# Patient Record
Sex: MALE | Race: WHITE | NOT HISPANIC OR LATINO | URBAN - METROPOLITAN AREA
[De-identification: names, ages, dates, MRNs, and addresses within clinical notes are randomized per-mention and may not be internally consistent; named-entity substitution may affect disease eponyms.]

---

## 2018-06-04 ENCOUNTER — OFFICE VISIT (OUTPATIENT)
Dept: URBAN - METROPOLITAN AREA CLINIC 23 | Facility: CLINIC | Age: 78
End: 2018-06-04
Payer: COMMERCIAL

## 2018-06-04 DIAGNOSIS — H02.132 SENILE ECTROPION OF RIGHT LOWER EYELID: Primary | ICD-10-CM

## 2018-06-04 PROCEDURE — 99214 OFFICE O/P EST MOD 30 MIN: CPT | Performed by: OPHTHALMOLOGY

## 2018-06-04 PROCEDURE — 92285 EXTERNAL OCULAR PHOTOGRAPHY: CPT | Performed by: OPHTHALMOLOGY

## 2018-06-04 ASSESSMENT — INTRAOCULAR PRESSURE
OD: 14
OS: 13

## 2018-06-04 NOTE — IMPRESSION/PLAN
Impression: Senile ectropion of right lower eyelid: H02.132. Plan: Discussed diagnosis in detail with patient. Discussed treatment options with patient. Surgical risks and benefits were discussed, explained and understood by patient. Surgical treatment is required. Patient elects to have surgery. Recommend Right lower eyelid ectropion repair. RL3.  Need clearance for all blood thinners

## 2018-08-01 ENCOUNTER — SURGERY (OUTPATIENT)
Dept: URBAN - METROPOLITAN AREA SURGERY 11 | Facility: SURGERY | Age: 78
End: 2018-08-01
Payer: COMMERCIAL

## 2018-08-01 PROCEDURE — 67917 REPAIR EYELID DEFECT: CPT | Performed by: OPHTHALMOLOGY

## 2018-08-03 ENCOUNTER — POST-OPERATIVE VISIT (OUTPATIENT)
Dept: URBAN - METROPOLITAN AREA CLINIC 23 | Facility: CLINIC | Age: 78
End: 2018-08-03
Payer: COMMERCIAL

## 2018-08-03 PROCEDURE — 99024 POSTOP FOLLOW-UP VISIT: CPT | Performed by: OPTOMETRIST

## 2018-08-03 RX ORDER — NEOMYCIN, POLYMYXIN B SULFATES, DEXAMETHASONE 1; 3.5; 1 MG/G; MG/G; [USP'U]/G
OINTMENT OPHTHALMIC PRN
Qty: 1 | Refills: 0 | Status: INACTIVE
Start: 2018-08-03 | End: 2021-04-02

## 2018-08-13 ENCOUNTER — POST-OPERATIVE VISIT (OUTPATIENT)
Dept: URBAN - METROPOLITAN AREA CLINIC 23 | Facility: CLINIC | Age: 78
End: 2018-08-13

## 2018-08-13 DIAGNOSIS — Z09 ENCNTR FOR F/U EXAM AFT TRTMT FOR COND OTH THAN MALIG NEOPLM: Primary | ICD-10-CM

## 2018-08-13 PROCEDURE — 99024 POSTOP FOLLOW-UP VISIT: CPT | Performed by: OPHTHALMOLOGY

## 2018-08-13 ASSESSMENT — INTRAOCULAR PRESSURE
OS: 11
OD: 13

## 2018-09-04 ENCOUNTER — POST-OPERATIVE VISIT (OUTPATIENT)
Dept: URBAN - METROPOLITAN AREA CLINIC 16 | Facility: CLINIC | Age: 78
End: 2018-09-04
Payer: COMMERCIAL

## 2018-09-04 PROCEDURE — 99024 POSTOP FOLLOW-UP VISIT: CPT | Performed by: OPTOMETRIST

## 2018-09-04 ASSESSMENT — INTRAOCULAR PRESSURE
OS: 14
OD: 16

## 2021-04-02 ENCOUNTER — OFFICE VISIT (OUTPATIENT)
Dept: URBAN - METROPOLITAN AREA CLINIC 29 | Facility: CLINIC | Age: 81
End: 2021-04-02
Payer: MEDICARE

## 2021-04-02 DIAGNOSIS — H31.093 OTHER CHORIORETINAL SCARS, BILATERAL: Primary | ICD-10-CM

## 2021-04-02 PROCEDURE — 92014 COMPRE OPH EXAM EST PT 1/>: CPT | Performed by: OPTOMETRIST

## 2021-04-02 ASSESSMENT — INTRAOCULAR PRESSURE
OD: 15
OS: 15

## 2021-04-02 NOTE — IMPRESSION/PLAN
Impression: Other chorioretinal scars, bilateral: H31.093. Plan: Discussed diagnosis in detail with patient. Discussed treatment options with patient. Use of vitamins has shown to improve the effects of ARMD.  Use of [place] grid was explained. Will continue to observe condition and or symptoms. Discussed risks of progression. Call if [address] worsens.

## 2023-01-25 ENCOUNTER — OFFICE VISIT (OUTPATIENT)
Dept: URBAN - METROPOLITAN AREA CLINIC 28 | Facility: CLINIC | Age: 83
End: 2023-01-25
Payer: MEDICARE

## 2023-01-25 DIAGNOSIS — H35.023: Primary | ICD-10-CM

## 2023-01-25 DIAGNOSIS — H16.211 EXPOSURE KERATOCONJUNCTIVITIS, RIGHT EYE: ICD-10-CM

## 2023-01-25 PROCEDURE — 92014 COMPRE OPH EXAM EST PT 1/>: CPT | Performed by: OPTOMETRIST

## 2023-01-25 RX ORDER — TOBRAMYCIN AND DEXAMETHASONE 3; 1 MG/G; MG/G
OINTMENT OPHTHALMIC
Qty: 3.5 | Refills: 3 | Status: ACTIVE
Start: 2023-01-25

## 2023-01-25 ASSESSMENT — INTRAOCULAR PRESSURE
OS: 15
OD: 15

## 2023-01-25 NOTE — IMPRESSION/PLAN
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 Plan: Discussed diagnosis in detail with patient. Discussed treatment options with patient. New medication(s) Rx given today. Will continue to observe condition and or symptoms. Patient instructed to call if condition gets worse. Reassured patient of current condition and treatment.

## 2023-01-25 NOTE — IMPRESSION/PLAN
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 Plan: Discussed diagnosis in detail with patient. No treatment is required at this time. Will continue to observe condition and or symptoms. Reassured patient of current condition and treatment. Call if Symptoms occur.